# Patient Record
Sex: FEMALE | Race: BLACK OR AFRICAN AMERICAN | NOT HISPANIC OR LATINO
[De-identification: names, ages, dates, MRNs, and addresses within clinical notes are randomized per-mention and may not be internally consistent; named-entity substitution may affect disease eponyms.]

---

## 2023-01-26 PROBLEM — Z00.00 ENCOUNTER FOR PREVENTIVE HEALTH EXAMINATION: Status: ACTIVE | Noted: 2023-01-26

## 2023-02-01 ENCOUNTER — APPOINTMENT (OUTPATIENT)
Dept: NEUROLOGY | Facility: CLINIC | Age: 53
End: 2023-02-01

## 2023-05-31 ENCOUNTER — APPOINTMENT (OUTPATIENT)
Dept: NEUROLOGY | Facility: CLINIC | Age: 53
End: 2023-05-31
Payer: MEDICAID

## 2023-05-31 VITALS
SYSTOLIC BLOOD PRESSURE: 124 MMHG | DIASTOLIC BLOOD PRESSURE: 85 MMHG | HEART RATE: 92 BPM | OXYGEN SATURATION: 98 % | BODY MASS INDEX: 27.21 KG/M2 | TEMPERATURE: 97.3 F | WEIGHT: 135 LBS | HEIGHT: 59 IN

## 2023-05-31 PROCEDURE — 95909 NRV CNDJ TST 5-6 STUDIES: CPT

## 2023-05-31 PROCEDURE — 99203 OFFICE O/P NEW LOW 30 MIN: CPT | Mod: 25

## 2023-06-01 NOTE — CONSULT LETTER
[Dear  ___] : Dear  [unfilled], [Consult Letter:] : I had the pleasure of evaluating your patient, [unfilled]. [Please see my note below.] : Please see my note below. [Consult Closing:] : Thank you very much for allowing me to participate in the care of this patient.  If you have any questions, please do not hesitate to contact me. [Sincerely,] : Sincerely, [FreeTextEntry3] : Francisco Pickens M.D.\par Neurology, Electromyography and Neuromuscular Medicine\par [facility]\par \par  of Neurology\par [facility] [employer]

## 2023-06-01 NOTE — PHYSICAL EXAM
[FreeTextEntry1] : CN2-12 intact\par Motor: R 4/5 finger extension and abduction, 4/5 thumb abduction b/l. Wrist flexion/extension pain limited. Biceps and deltoid 4+/5 pain limited. otherwise 5/5 throughout\par Sensory intact to LT throughout. Tinel's sign equivocal \par Reflexes 3+ throughout, + Kwok's b/l

## 2023-06-01 NOTE — CONSULT LETTER
[Dear  ___] : Dear  [unfilled], [Consult Letter:] : I had the pleasure of evaluating your patient, [unfilled]. [Please see my note below.] : Please see my note below. [Consult Closing:] : Thank you very much for allowing me to participate in the care of this patient.  If you have any questions, please do not hesitate to contact me. [Sincerely,] : Sincerely, [FreeTextEntry3] : Francisco Pickens M.D.\par Neurology, Electromyography and Neuromuscular Medicine\par [facility]\par \par  of Neurology\par [facility] [employer]

## 2023-06-01 NOTE — HISTORY OF PRESENT ILLNESS
[FreeTextEntry1] : Patient is a 51yo RH F with hx manic depression, COPD, asthma who presents for bilateral hand weaknses and numbness. She was referred for concern of carpal tunnel of R>L hand. Symptoms on going for over 2 years and progressive. Now with weakness in addition to the pain. Dropping things from her hand, difficulty writing. Has not had EMG in the past.

## 2023-06-01 NOTE — PROCEDURE
[FreeTextEntry1] : \par Nerve Conduction and Electromyography Report\par  [FreeTextEntry3] : \par Electro Physiologic Findings:\par \par Limb temperature was monitored and maintained at approximately 32 – 36° C in the upper extremities.\par \par The right median sensory response was mildly slow across the wrist, with normal amplitude. The right median motor response was normal. The right ulnar sensory response was normal. The right ulnar motor response was slow across the elbow without conduction block. The right lumbrical study was mildly positive. \par \par Clinical Electrophysiological Impression: \par \par This electrodiagnostic study demonstrated a mild right median nerve entrapment at the wrist and a mild right ulnar nerve entrapment at the elbow. However, the patient’s symptoms are out of proportion to these findings and may be due to a central process. Further evaluation with cervical spine MRI is recommended.

## 2023-06-01 NOTE — ASSESSMENT
[FreeTextEntry1] : NCS demonstrated mild right median and ulnar nerve entrapments, however symptoms and exam out of proportion to these and suggest cervical myelopathy. Will get MRI C spine to evaluate. \par \par See separate procedure note for full results of NCS/EMG study

## 2023-08-04 ENCOUNTER — NON-APPOINTMENT (OUTPATIENT)
Age: 53
End: 2023-08-04

## 2023-08-25 ENCOUNTER — APPOINTMENT (OUTPATIENT)
Dept: NEUROLOGY | Facility: CLINIC | Age: 53
End: 2023-08-25
Payer: MEDICAID

## 2023-08-25 VITALS
HEIGHT: 59 IN | BODY MASS INDEX: 27.21 KG/M2 | SYSTOLIC BLOOD PRESSURE: 101 MMHG | WEIGHT: 135 LBS | OXYGEN SATURATION: 98 % | HEART RATE: 90 BPM | DIASTOLIC BLOOD PRESSURE: 70 MMHG | TEMPERATURE: 98.1 F

## 2023-08-25 DIAGNOSIS — R20.0 ANESTHESIA OF SKIN: ICD-10-CM

## 2023-08-25 DIAGNOSIS — R20.2 ANESTHESIA OF SKIN: ICD-10-CM

## 2023-08-25 DIAGNOSIS — R29.2 ABNORMAL REFLEX: ICD-10-CM

## 2023-08-25 PROCEDURE — 99213 OFFICE O/P EST LOW 20 MIN: CPT

## 2023-08-25 NOTE — HISTORY OF PRESENT ILLNESS
[FreeTextEntry1] : Hand numbness somewhat better with brace Having some mid to lower back pain for the past few days   Personally reviewed and interpreted: MRI C spine - no cord compression